# Patient Record
Sex: FEMALE | Race: AMERICAN INDIAN OR ALASKA NATIVE
[De-identification: names, ages, dates, MRNs, and addresses within clinical notes are randomized per-mention and may not be internally consistent; named-entity substitution may affect disease eponyms.]

---

## 2019-03-21 ENCOUNTER — HOSPITAL ENCOUNTER (INPATIENT)
Dept: HOSPITAL 5 - ED | Age: 54
LOS: 2 days | Discharge: HOME | DRG: 304 | End: 2019-03-23
Attending: INTERNAL MEDICINE | Admitting: INTERNAL MEDICINE
Payer: SELF-PAY

## 2019-03-21 DIAGNOSIS — I42.0: ICD-10-CM

## 2019-03-21 DIAGNOSIS — I16.0: Primary | ICD-10-CM

## 2019-03-21 DIAGNOSIS — E87.0: ICD-10-CM

## 2019-03-21 DIAGNOSIS — I11.0: ICD-10-CM

## 2019-03-21 DIAGNOSIS — Z91.19: ICD-10-CM

## 2019-03-21 DIAGNOSIS — I50.43: ICD-10-CM

## 2019-03-21 DIAGNOSIS — Z98.51: ICD-10-CM

## 2019-03-21 DIAGNOSIS — Z82.49: ICD-10-CM

## 2019-03-21 DIAGNOSIS — E87.6: ICD-10-CM

## 2019-03-21 LAB
BASOPHILS # (AUTO): 0 K/MM3 (ref 0–0.1)
BASOPHILS NFR BLD AUTO: 0.5 % (ref 0–1.8)
BUN SERPL-MCNC: 21 MG/DL (ref 7–17)
BUN/CREAT SERPL: 19 %
CALCIUM SERPL-MCNC: 8.5 MG/DL (ref 8.4–10.2)
EOSINOPHIL # BLD AUTO: 0 K/MM3 (ref 0–0.4)
EOSINOPHIL NFR BLD AUTO: 0.4 % (ref 0–4.3)
HCT VFR BLD CALC: 32.6 % (ref 30.3–42.9)
HEMOLYSIS INDEX: 2
HGB BLD-MCNC: 10.3 GM/DL (ref 10.1–14.3)
LYMPHOCYTES # BLD AUTO: 1.2 K/MM3 (ref 1.2–5.4)
LYMPHOCYTES NFR BLD AUTO: 15.3 % (ref 13.4–35)
MCHC RBC AUTO-ENTMCNC: 32 % (ref 30–34)
MCV RBC AUTO: 90 FL (ref 79–97)
MONOCYTES # (AUTO): 0.6 K/MM3 (ref 0–0.8)
MONOCYTES % (AUTO): 7.9 % (ref 0–7.3)
PLATELET # BLD: 380 K/MM3 (ref 140–440)
RBC # BLD AUTO: 3.62 M/MM3 (ref 3.65–5.03)

## 2019-03-21 PROCEDURE — A9502 TC99M TETROFOSMIN: HCPCS

## 2019-03-21 PROCEDURE — 84484 ASSAY OF TROPONIN QUANT: CPT

## 2019-03-21 PROCEDURE — 93005 ELECTROCARDIOGRAM TRACING: CPT

## 2019-03-21 PROCEDURE — 94760 N-INVAS EAR/PLS OXIMETRY 1: CPT

## 2019-03-21 PROCEDURE — 82550 ASSAY OF CK (CPK): CPT

## 2019-03-21 PROCEDURE — 85025 COMPLETE CBC W/AUTO DIFF WBC: CPT

## 2019-03-21 PROCEDURE — 36415 COLL VENOUS BLD VENIPUNCTURE: CPT

## 2019-03-21 PROCEDURE — 71046 X-RAY EXAM CHEST 2 VIEWS: CPT

## 2019-03-21 PROCEDURE — 93010 ELECTROCARDIOGRAM REPORT: CPT

## 2019-03-21 PROCEDURE — 93017 CV STRESS TEST TRACING ONLY: CPT

## 2019-03-21 PROCEDURE — 78452 HT MUSCLE IMAGE SPECT MULT: CPT

## 2019-03-21 PROCEDURE — 80048 BASIC METABOLIC PNL TOTAL CA: CPT

## 2019-03-21 PROCEDURE — 71275 CT ANGIOGRAPHY CHEST: CPT

## 2019-03-21 PROCEDURE — 96374 THER/PROPH/DIAG INJ IV PUSH: CPT

## 2019-03-21 PROCEDURE — 82553 CREATINE MB FRACTION: CPT

## 2019-03-21 PROCEDURE — 93306 TTE W/DOPPLER COMPLETE: CPT

## 2019-03-21 PROCEDURE — 83880 ASSAY OF NATRIURETIC PEPTIDE: CPT

## 2019-03-21 NOTE — HISTORY AND PHYSICAL REPORT
History of Present Illness


Date of examination: 03/21/19


History of present illness: 


53-year-old woman who has a history of hypertension, diagnosed in 2016, 

noncompliant with medication since that time course emergency room with 

complaints of shortness of breath 3 days, describing exertion, PND, orthopnea 

and lower extremity edema


Review of systems


Constitutional: no  weight loss, chills, fever


Ears, eyes, nose, mouth and throat: no nasal congestion, no nasal discharge, no 

sinus pressure, no vision change, no red eye.


Neck: No neck pain or rigidity.


Cardiovascular: no palpitations, chest pain


Respiratory: no cough, +shortness of breath


Gastrointestinal: no hematochezia, abdominal pain


Genitourinary : no  frequency , no hematuria


Musculoskeletal: no joint swelling or muscle ache 


Integumentary: no rash, no pruritis


Neurological: no parathesias, no focal weakness


Endocrine: no cold or heat intolerance, no polyuria or polydipsia


Hematologic/Lymphatic: no easy bruising, no easy bleeding, no gland swelling


Allergic/Immunologic: no urticaria, no angioedema.





PAST MEDICAL HISTORY:hypertension





PAST SURGICAL HISTORY: Tubal ligation





SOCIAL HISTORY: Social alcohol,  no drugs, tobacco





FAMILY HISTORY: Hypertension








Medications and Allergies


                                    Allergies











Allergy/AdvReac Type Severity Reaction Status Date / Time


 


No Known Allergies Allergy   Verified 03/21/19 16:35














Exam





- Physical Exam


Narrative exam: 


General Apperance: The patient lying in bed,  breathing comfortable 


HEENT: Normocephalic, atraumatic.  Pupils equally round and reactive to light, 

EOMI, no sclericterus or JVD or thyromegaly or nodule.  , no carotid bruit, 

mucous membranes moist, no exudate or erythema


Heart: S1-S2, regular is rhythm


Lungs: Crackles bilaterally, breathing comfortable


Abdomen: Positive bowel sounds, soft, nontender, nondistended, no organomegaly


Extremities: + edema, no  cyanosis clubbing


Skin: no rash, nodule, warm and dry


Neuro: cranial nerves 2-12 intact, speech is fluent, motor/sensory intact








- Constitutional


Vitals: 


                                        











Temp Pulse Resp BP Pulse Ox


 


 98.3 F   110 H  28 H  140/93   98 


 


 03/21/19 19:48  03/21/19 22:28  03/21/19 22:28  03/21/19 22:28  03/21/19 22:28














Results





- Labs


CBC & Chem 7: 


                                 03/21/19 17:19





                                 03/21/19 17:19


Labs: 


                              Abnormal lab results











  03/21/19 03/21/19 03/21/19 Range/Units





  17:19 17:19 20:33 


 


RBC  3.62 L    (3.65-5.03)  M/mm3


 


RDW  17.4 H    (13.2-15.2)  %


 


Mono % (Auto)  7.9 H    (0.0-7.3)  %


 


Seg Neutrophils %  75.9 H    (40.0-70.0)  %


 


Potassium   3.4 L   (3.6-5.0)  mmol/L


 


BUN   21 H   (7-17)  mg/dL


 


Glucose   106 H   ()  mg/dL


 


NT-Pro-B Natriuret Pep    12375 H  (0-900)  pg/mL














- Imaging and Cardiology


EKG: image reviewed


Chest x-ray: report reviewed


CT scan - chest: report reviewed





Assessment and Plan


Assessment


New-onset CHF versus hypertensive urgency


Hypertensive urgency: Malignant


Plan


Admit to medicine


Diurese with IV Lasix


sTART Beta blocker, ACE inhibitor, aspirin, check cardiac enzymes, echo


Consult cardiology, DVT prophylaxis


Mitre I's and O's, daily weights

## 2019-03-21 NOTE — XRAY REPORT
PROCEDURE: XR CHEST ROUTINE 2V 

 

TECHNIQUE:  2 view chest 

 

HISTORY: Dyspnea 

 

COMPARISONS: 

 

FINDINGS: 

 

Cardiac silhouette is moderately enlarged. There is bilateral pulmonary vascular congestion with inte
rstitial prominence. No pleural fluid collection identified. 

 

IMPRESSION:  

 

Mild to moderate cardiomegaly. Findings suggestive of CHF. 

 

This document is electronically signed by Bk Conteh MD., March 21 2019 06:12:25 PM ET

## 2019-03-21 NOTE — CAT SCAN REPORT
PROCEDURE: CT angiogram chest with contrast. 

 

TECHNIQUE:  Computerized tomographic angiography of the chest was performed after the IV injection of
 iodinated nonionic contrast including image processing.  The image data was postprocessed using 2-di
mensional multiplanar reformatted (MPR) and 3-dimensional (MIP and/or volume rendered) techniques. Au
tomated exposure control, adjustment of mA and/or kV according to patient size, or iterative reconstr
uction dose optimization techniques were utilized.  

 

CT DOSE LENGTH PRODUCT:  707.85  mGycm 

 

HISTORY: Shortness of breath, dyspnea on exertion, rule out pulmonary embolism. 

 

COMPARISONS:  None. 

 

FINDINGS: 

 

The trachea and central bronchi appear normal. There is mild subsegmental atelectasis in the dependen
t portion of the right lower lobe and in the anterior portion of the left lower lobe. The lungs are o
therwise clear. There is a moderate sized right pleural effusion and a small left pleural effusion. T
he thoracic aorta has a normal caliber without evidence of dissection. The pulmonary arteries enhance
 normally. There is no evidence of pulmonary embolism. There are a few small nonspecific mediastinal 
lymph nodes. The heart size is mildly enlarged. The thoracic skeleton appears intact. 

 

IMPRESSION:  Moderate sized right pleural effusion and small left pleural effusion. No evidence of pu
lmonary embolism. 

 

This document is electronically signed by Richard Piedra MD., March 21 2019 08:53:38 PM ET

## 2019-03-21 NOTE — EMERGENCY DEPARTMENT REPORT
ED Shortness of Breath HPI





- General


Chief Complaint: Dyspnea/Respdistress


Stated Complaint: RICHI


Time Seen by Provider: 03/21/19 19:10


Source: patient


Mode of arrival: Ambulatory


Limitations: No Limitations





- History of Present Illness


Initial Comments: 





Patient is a 53-year-old female that since emergency room with complaints of 

dyspnea on exertion and shortness of breath.  Patient states her symptoms 

started yesterday.  Patient states her symptoms are worsening.  Patient states 3

days ago she took a long car ride of 7+ hours.  The patient states her symptoms 

are worse with exertion and movement and better with rest.  Patient denies chest

pain.  Patient denies fever and chills.  Patient denies headache and dizziness.


MD Complaint: shortness of breath


-: Sudden


Severity: severe


Improves With: rest


Worsens With: exertion


Treatments Prior to Arrival: none





- Related Data


Home Oxygen Therapy: No


                                    Allergies











Allergy/AdvReac Type Severity Reaction Status Date / Time


 


No Known Allergies Allergy   Verified 03/21/19 16:35














ED Review of Systems


ROS: 


Stated complaint: RICHI


Other details as noted in HPI





Constitutional: denies: chills, fever


Eyes: denies: eye pain, eye discharge, vision change


ENT: denies: ear pain, throat pain


Respiratory: shortness of breath, SOB with exertion, SOB at rest.  denies: 

cough, wheezing


Cardiovascular: denies: chest pain, palpitations


Endocrine: no symptoms reported


Gastrointestinal: denies: abdominal pain, nausea, diarrhea


Genitourinary: denies: urgency, dysuria, discharge


Musculoskeletal: denies: back pain, joint swelling, arthralgia


Skin: denies: rash, lesions


Neurological: denies: headache, weakness, paresthesias


Psychiatric: denies: anxiety, depression


Hematological/Lymphatic: denies: easy bleeding, easy bruising





ED Past Medical Hx





- Past Medical History


Previous Medical History?: Yes


Hx Hypertension: Yes





- Surgical History


Past Surgical History?: No





- Family History


Family history: no significant





- Social History


Smoking Status: Never Smoker


Substance Use Type: Alcohol





ED Physical Exam





- General


Limitations: No Limitations


General appearance: alert, in no apparent distress





- Head


Head exam: Present: atraumatic, normocephalic





- Eye


Eye exam: Present: normal appearance





- ENT


ENT exam: Present: mucous membranes moist





- Neck


Neck exam: Present: normal inspection





- Respiratory


Respiratory exam: Present: normal lung sounds bilaterally.  Absent: respiratory 

distress





- Cardiovascular


Cardiovascular Exam: Present: regular rate, normal rhythm.  Absent: systolic 

murmur, diastolic murmur, rubs, gallop





- GI/Abdominal


GI/Abdominal exam: Present: soft, normal bowel sounds





- Extremities Exam


Extremities exam: Present: normal inspection





- Back Exam


Back exam: Present: normal inspection





- Neurological Exam


Neurological exam: Present: alert, oriented X3





- Psychiatric


Psychiatric exam: Present: normal affect, normal mood





- Skin


Skin exam: Present: warm, dry, intact, normal color.  Absent: rash





ED Course


                                   Vital Signs











  03/21/19 03/21/19 03/21/19





  17:10 19:48 19:49


 


Temperature 97.8 F 98.3 F 


 


Pulse Rate 115 H 110 H 


 


Respiratory 22 28 H 28 H





Rate   


 


Blood Pressure 165/115  


 


Blood Pressure  162/93 





[Left]   


 


O2 Sat by Pulse 100 97 97





Oximetry   














  03/21/19 03/21/19 03/21/19





  20:03 20:16 20:58


 


Temperature   


 


Pulse Rate 110 H 107 H 109 H


 


Respiratory  28 H 30 H





Rate   


 


Blood Pressure   


 


Blood Pressure  155/107 160/99





[Left]   


 


O2 Sat by Pulse  93 98





Oximetry   














- Reevaluation(s)


Reevaluation #1: 





03/21/19 21:17


Discussed all results with patient.  Patient agrees with plan of care and 

admission.  Patient was admitted to the hospitalist service.





- Consultations


Consultation #1: 


Hospitalist consulted for admission.  Hospitalist to admit patient.  Hospitalist

 to assume care patient.


03/21/19 21:17








ED Medical Decision Making





- Lab Data


Result diagrams: 


                                 03/21/19 17:19





                                 03/21/19 17:19





- EKG Data


-: EKG Interpreted by Me


EKG shows normal: sinus rhythm, axis, intervals, QRS complexes, ST-T waves


Rate: tachycardia





- EKG Data


Interpretation: LVH





- Radiology Data


Radiology results: report reviewed, image reviewed


interpreted by me: 





Bilateral effusions noted on chest x-ray.  And cardiomegaly





PROCEDURE: XR CHEST ROUTINE 2V 





TECHNIQUE: 2 view chest 





HISTORY: Dyspnea 





COMPARISONS: 





FINDINGS: 





Cardiac silhouette is moderately enlarged. There is bilateral pulmonary vascular

 congestion with 


interstitial prominence. No pleural fluid collection identified. 





IMPRESSION: 





Mild to moderate cardiomegaly. Findings suggestive of CHF. 




















PROCEDURE: CT angiogram chest with contrast. 





TECHNIQUE: Computerized tomographic angiography of the chest was performed after

 the IV injection 


of iodinated nonionic contrast including image processing. The image data was 

postprocessed using 


2-dimensional multiplanar reformatted (MPR) and 3-dimensional (MIP and/or volume

 rendered) 


techniques. Automated exposure control, adjustment of mA and/or kV according to 

patient size, or 


iterative reconstruction dose optimization techniques were utilized. 





CT DOSE LENGTH PRODUCT: 707.85 mGycm 





HISTORY: Shortness of breath, dyspnea on exertion, rule out pulmonary embolism. 





COMPARISONS: None. 





FINDINGS: 





The trachea and central bronchi appear normal. There is mild subsegmental 

atelectasis in the 


dependent portion of the right lower lobe and in the anterior portion of the 

left lower lobe. The 


lungs are otherwise clear. There is a moderate sized right pleural effusion and 

a small left pleural


effusion. The thoracic aorta has a normal caliber without evidence of 

dissection. The pulmonary 


arteries enhance normally. There is no evidence of pulmonary embolism. There are

 a few small 


nonspecific mediastinal lymph nodes. The heart size is mildly enlarged. The 

thoracic skeleton 


appears intact. 





IMPRESSION: Moderate sized right pleural effusion and small left pleural 

effusion. No evidence of 


pulmonary embolism. 








- Medical Decision Making


Patient is a 53-year-old female that presents emergency room with complaints of 

shortness of breath and Exertion.  Patient Found to Have Hypoxia.  Patient 

Placed on Oxygen.  Patient Also Found to Have CHF Changes.  EKG Positive for 

Sinus Tach and Possible LVH.  Patient's Chest X-Ray Shows Bilateral Effusions.  

CT A Done to Rule Out PE.  Patient Found to Have a Moderate-Sized Right Pleural 

Effusion and a Small Left Pleural Effusion.  Patient will be treated with Lasix 

for her CHF.  Patient's new onset CHF.  Patient's BNP elevated.








- Differential Diagnosis


chf. espinoza. sob. pe. htn


Critical Care Time: Yes


Critical care attestation.: 


If time is entered above; I have spent that time in minutes in the direct care 

of this critically ill patient, excluding procedure time.





Critical Care Time: 





35 minutes





ED Disposition


Clinical Impression: 


 SOB (shortness of breath), ESPINOZA (dyspnea on exertion), New onset of congestive 

heart failure, Hypoxia, Pleural effusion





CHF (congestive heart failure)


Qualifiers:


 Heart failure type: unspecified Heart failure chronicity: acute Qualified Code(

s): I50.9 - Heart failure, unspecified





Disposition: DC-09 OP ADMIT IP TO THIS HOSP


Is pt being admited?: Yes


Does the pt Need Aspirin: No


Condition: Critical


Referrals: 


[facility], [place] [Primary Care Provider] - 3-5 Days


Time of Disposition: 21:16

## 2019-03-21 NOTE — EMERGENCY DEPARTMENT REPORT
Blank Doc





- Documentation


Documentation: 





This is a 53-year-old female that presents with shortness of breathe.  Denies 

any chest pain.  Denies any other complaints or symptoms.





This initial assessment/diagnostic orders/clinical plan/treatment(s) is/are 

subject to change based on patient's health status, clinical progression and re-

assessment by fellow clinical providers in the ED.  Further treatment and workup

at subsequent clinical providers discretion.  Patient/guardians urged not to 

elope from the ED as their condition may be serious if not clinically assessed 

and managed.  Initial orders include:


1- Patient sent to [facility] for further evaluation and treatment


2- labs


3- EKG


4- CXR

## 2019-03-22 LAB
BASOPHILS # (AUTO): 0 K/MM3 (ref 0–0.1)
BASOPHILS NFR BLD AUTO: 0.4 % (ref 0–1.8)
BUN SERPL-MCNC: 18 MG/DL (ref 7–17)
BUN/CREAT SERPL: 16 %
CALCIUM SERPL-MCNC: 8.2 MG/DL (ref 8.4–10.2)
EOSINOPHIL # BLD AUTO: 0.1 K/MM3 (ref 0–0.4)
EOSINOPHIL NFR BLD AUTO: 1 % (ref 0–4.3)
HCT VFR BLD CALC: 30.1 % (ref 30.3–42.9)
HEMOLYSIS INDEX: 9
HGB BLD-MCNC: 9.5 GM/DL (ref 10.1–14.3)
LYMPHOCYTES # BLD AUTO: 1.3 K/MM3 (ref 1.2–5.4)
LYMPHOCYTES NFR BLD AUTO: 15.9 % (ref 13.4–35)
MCHC RBC AUTO-ENTMCNC: 32 % (ref 30–34)
MCV RBC AUTO: 90 FL (ref 79–97)
MONOCYTES # (AUTO): 0.7 K/MM3 (ref 0–0.8)
MONOCYTES % (AUTO): 9.2 % (ref 0–7.3)
PLATELET # BLD: 327 K/MM3 (ref 140–440)
RBC # BLD AUTO: 3.36 M/MM3 (ref 3.65–5.03)

## 2019-03-22 RX ADMIN — Medication SCH ML: at 22:24

## 2019-03-22 RX ADMIN — CARVEDILOL SCH MG: 3.12 TABLET, FILM COATED ORAL at 10:50

## 2019-03-22 RX ADMIN — CARVEDILOL SCH MG: 3.12 TABLET, FILM COATED ORAL at 22:24

## 2019-03-22 RX ADMIN — ENOXAPARIN SODIUM SCH MG: 100 INJECTION SUBCUTANEOUS at 10:50

## 2019-03-22 RX ADMIN — ASPIRIN SCH MG: 81 TABLET, CHEWABLE ORAL at 10:49

## 2019-03-22 RX ADMIN — Medication SCH ML: at 10:50

## 2019-03-22 RX ADMIN — LISINOPRIL SCH MG: 5 TABLET ORAL at 10:49

## 2019-03-22 NOTE — PROGRESS NOTE
Assessment and Plan





New-onset CHF, systolic HF with EF ~15%


Hypertensive urgency: Malignant


hypernatremia


hypokalemia





Plan


monitor at medicine


cont Diuresis with IV Lasix, Beta blocker, ACE inhibitor, aspirin, statin


Consulted cardiology, DVT prophylaxis


Mitre I's and O's, daily weights


Plan for stress test tomorrow


replete electrolytes











Subjective


Date of service: 03/22/19


Interval history: 





Patient seen and examined


Denies any chest pain, breathing much better


Plan for stress test tomorrow











Objective





- Exam


Narrative Exam: 





General Apperance: The patient lying in bed,  breathing comfortable 


HEENT: Normocephalic, atraumatic.  Pupils equally round and reactive to light, 

EOMI, no sclericterus or JVD or thyromegaly or nodule.  , no carotid bruit, 

mucous membranes moist, no exudate or erythema


Heart: S1-S2, regular is rhythm


Lungs: + Crackles bilaterally, breathing comfortable


Abdomen: Positive bowel sounds, soft, nontender, nondistended, no organomegaly


Extremities: trace edema, no  cyanosis clubbing


Skin: no rash, nodule, warm and dry


Neuro: cranial nerves 2-12 intact, speech is fluent, motor/sensory intact











- Constitutional


Vitals: 


                               Vital Signs - 12hr











  03/22/19 03/22/19 03/22/19





  09:24 10:00 10:49


 


Temperature 98.4 F  


 


Pulse Rate 101 H  101 H


 


Respiratory 18  





Rate   


 


Blood Pressure 144/96  


 


O2 Sat by Pulse 100 100 





Oximetry   














  03/22/19





  16:47


 


Temperature 


 


Pulse Rate 99 H


 


Respiratory 





Rate 


 


Blood Pressure 137/81


 


O2 Sat by Pulse 100





Oximetry 














- Labs


CBC & Chem 7: 


                                 03/22/19 04:55





                                 03/23/19 04:33


Labs: 


                              Abnormal lab results











  03/21/19 03/21/19 03/21/19 Range/Units





  17:19 17:19 20:33 


 


RBC  3.62 L    (3.65-5.03)  M/mm3


 


Hgb     (10.1-14.3)  gm/dl


 


Hct     (30.3-42.9)  %


 


RDW  17.4 H    (13.2-15.2)  %


 


Mono % (Auto)  7.9 H    (0.0-7.3)  %


 


Seg Neutrophils %  75.9 H    (40.0-70.0)  %


 


Sodium     (137-145)  mmol/L


 


Potassium   3.4 L   (3.6-5.0)  mmol/L


 


Chloride     ()  mmol/L


 


BUN   21 H   (7-17)  mg/dL


 


Glucose   106 H   ()  mg/dL


 


Calcium     (8.4-10.2)  mg/dL


 


NT-Pro-B Natriuret Pep    74228 H  (0-900)  pg/mL














  03/22/19 03/22/19 Range/Units





  04:55 04:55 


 


RBC  3.36 L   (3.65-5.03)  M/mm3


 


Hgb  9.5 L   (10.1-14.3)  gm/dl


 


Hct  30.1 L   (30.3-42.9)  %


 


RDW  17.3 H   (13.2-15.2)  %


 


Mono % (Auto)  9.2 H   (0.0-7.3)  %


 


Seg Neutrophils %  73.5 H   (40.0-70.0)  %


 


Sodium   149 H  (137-145)  mmol/L


 


Potassium   3.3 L  (3.6-5.0)  mmol/L


 


Chloride   109.2 H  ()  mmol/L


 


BUN   18 H  (7-17)  mg/dL


 


Glucose   103 H  ()  mg/dL


 


Calcium   8.2 L  (8.4-10.2)  mg/dL


 


NT-Pro-B Natriuret Pep    (0-900)  pg/mL

## 2019-03-22 NOTE — CONSULTATION
History of Present Illness


Consult date: 03/22/19


Consult reason: congestive heart failure


History of present illness: 





Patient is a 53 year old woman who has no prior medical history and does not 

take any medications. She presented with shortness of breath that started 

approximately 5 days ago. She denies chest pain, palpitations and dizziness. 

There was no syncope. Patient reports mild edema but this has since resolved 

since initial treatment in the emergency department. She has an elevated BNP and

her chest x-ray shows cardiomegaly with interstitial edema. Chest CT scan 

negative for pulmonary embolism. A cardiac consultation was requested for CHF 

evaluation.





Past History


Past Medical History: No medical history





Medications and Allergies


                                    Allergies











Allergy/AdvReac Type Severity Reaction Status Date / Time


 


No Known Allergies Allergy   Verified 03/21/19 16:35











Active Meds: 


Active Medications





Acetaminophen (Tylenol)  650 mg PO Q4H PRN


   PRN Reason: Pain MILD(1-3)/Fever >100.5/HA


Aspirin (Baby Aspirin)  81 mg PO QDAY [facility]


   Last Admin: 03/22/19 10:49 Dose:  81 mg


   Documented by: 


Carvedilol (Coreg)  3.125 mg PO BID [facility]


   Last Admin: 03/22/19 10:50 Dose:  3.125 mg


   Documented by: 


Enoxaparin Sodium (Lovenox)  40 mg SUB-Q QDAY [facility]


   Last Admin: 03/22/19 10:50 Dose:  40 mg


   Documented by: 


Furosemide (Lasix)  40 mg IV BID [facility]


   Last Admin: 03/22/19 10:50 Dose:  40 mg


   Documented by: 


Lisinopril (Zestril)  2.5 mg PO QDAY [facility]


   Last Admin: 03/22/19 10:49 Dose:  2.5 mg


   Documented by: 


Ondansetron HCl (Zofran)  4 mg IV Q8H PRN


   PRN Reason: Nausea And Vomiting


Sodium Chloride (Sodium Chloride Flush Syringe 10 Ml)  10 ml IV BID [facility]


   Last Admin: 03/22/19 10:50 Dose:  10 ml


   Documented by: 


Sodium Chloride (Sodium Chloride Flush Syringe 10 Ml)  10 ml IV PRN PRN


   PRN Reason: LINE FLUSH











Physical Examination


                                   Vital Signs











Temp Pulse Resp BP Pulse Ox


 


 97.8 F   115 H  22   165/115   100 


 


 03/21/19 17:10  03/21/19 17:10  03/21/19 17:10  03/21/19 17:10  03/21/19 17:10











General appearance: no acute distress


HEENT: Positive: PERRL


Neck: Positive: trachea midline


Cardiac: Positive: Reg Rate and Rhythm


Lungs: Positive: Decreased Breath Sounds


Neuro: Positive: Grossly Intact


Extremities: Absent: edema





Results





                                 03/22/19 04:55





                                 03/22/19 04:55


                                 Cardiac Enzymes











  03/21/19 03/22/19 Range/Units





  22:57 04:55 


 


CK-MB (CK-2)  2.2  2.0  (0.0-4.0)  ng/mL








                                       CBC











  03/21/19 03/22/19 Range/Units





  17:19 04:55 


 


WBC  7.9  7.8  (4.5-11.0)  K/mm3


 


RBC  3.62 L  3.36 L  (3.65-5.03)  M/mm3


 


Hgb  10.3  9.5 L  (10.1-14.3)  gm/dl


 


Hct  32.6  30.1 L  (30.3-42.9)  %


 


Plt Count  380  327  (140-440)  K/mm3


 


Lymph #  1.2  1.3  (1.2-5.4)  K/mm3


 


Mono #  0.6  0.7  (0.0-0.8)  K/mm3


 


Eos #  0.0  0.1  (0.0-0.4)  K/mm3


 


Baso #  0.0  0.0  (0.0-0.1)  K/mm3








                          Comprehensive Metabolic Panel











  03/21/19 03/22/19 Range/Units





  17:19 04:55 


 


Sodium  144  149 H  (137-145)  mmol/L


 


Potassium  3.4 L  3.3 L  (3.6-5.0)  mmol/L


 


Chloride  106.0  109.2 H  ()  mmol/L


 


Carbon Dioxide  23  24  (22-30)  mmol/L


 


BUN  21 H  18 H  (7-17)  mg/dL


 


Creatinine  1.1  1.1  (0.7-1.2)  mg/dL


 


Glucose  106 H  103 H  ()  mg/dL


 


Calcium  8.5  8.2 L  (8.4-10.2)  mg/dL














Assessment and Plan





- Patient Problems


(1) CHF (congestive heart failure)


Current Visit: Yes   Status: Acute   


Qualifiers: 


   Qualified Code(s): I50.9 - Heart failure, unspecified   


Plan to address problem: 





Continue current medical therapy including IV diuretics.





We will obtain an echocardiogram for LVEF assessment.

## 2019-03-23 VITALS — SYSTOLIC BLOOD PRESSURE: 132 MMHG | DIASTOLIC BLOOD PRESSURE: 85 MMHG

## 2019-03-23 LAB
BUN SERPL-MCNC: 18 MG/DL (ref 7–17)
BUN/CREAT SERPL: 15 %
CALCIUM SERPL-MCNC: 8.6 MG/DL (ref 8.4–10.2)
HEMOLYSIS INDEX: 3

## 2019-03-23 RX ADMIN — Medication SCH ML: at 11:45

## 2019-03-23 RX ADMIN — LISINOPRIL SCH MG: 5 TABLET ORAL at 11:42

## 2019-03-23 RX ADMIN — ENOXAPARIN SODIUM SCH MG: 100 INJECTION SUBCUTANEOUS at 11:44

## 2019-03-23 RX ADMIN — CARVEDILOL SCH MG: 3.12 TABLET, FILM COATED ORAL at 11:42

## 2019-03-23 RX ADMIN — ASPIRIN SCH MG: 81 TABLET, CHEWABLE ORAL at 11:44

## 2019-03-23 NOTE — DISCHARGE SUMMARY
Providers





- Providers


Date of Admission: 


03/21/19 22:38





Date of discharge: 03/23/19


Attending physician: 


BHASKAR SANCHEZ





                                        





03/21/19 22:38


Consult to Physician [CONS] Routine 


   Comment: 


   Consulting Provider: OZZY VAZQUEZ


   Physician Instructions: 


   Reason For Exam: ?chf











Primary care physician: 


PRIMARY CARE MD








Hospitalization


Condition: Critical


Pertinent studies: 





Lexiscan MPI shows a dilated nonischemic cardiomyopathy with left ventricular 

ejection fraction 23%


Echocardiogram shows dilated cardiomyopathy with left ventricular ejection fr

action of 15-20%.








Hospital course: 


Patient is a 53 year old woman who has no prior medical history and does not 

take any medications. She presented with shortness of breath that started 

approximately 5 days ago.  She has an elevated BNP and her chest x-ray shows 

cardiomegaly with interstitial edema. Chest CT scan negative for pulmonary 

embolism. She was admitted for further evaluation and management. Placed on 

Diuresis with IV Lasix, alos started on Beta blocker, ACE inhibitor, aspirin, 

statin


Consulted cardiology, Monitored I's and O's, daily weights, repleted elec

trolytes. Lexiscan MPI shows a dilated nonischemic cardiomyopathy with left 

ventricular ejection fraction 23%, Echocardiogram shows dilated cardiomyopathy 

with left ventricular ejection fraction of 15-20%. Cardiology recommended 

medical Mx and outpt followup. She was then discharged home with outpt followup.

 





Discharge diagnosis:


New-onset CHF, systolic and diastolic HF with EF ~15%


Hypertensive urgency: Malignant, resolved


Non ischemic cardiomyopathy


hypernatremia, stable


hypokalemia, repleted








Disposition: DC-01 TO HOME OR SELFCARE


Time spent for discharge: 34 minutes





Core Measure Documentation





- Palliative Care


Palliative Care/ Comfort Measures: Not Applicable





- Core Measures


Any of the following diagnoses?: heart failure





- Heart Failure Discharge Requirements


ACE/ARB for LVSD if EF <40%: Yes


Beta blocker at discharge: Yes





Exam





- Physical Exam


Narrative exam: 





General Apperance: The patient lying in bed,  breathing comfortable 


HEENT: Normocephalic, atraumatic.  Pupils equally round and reactive to light, 

EOMI, no sclericterus or JVD or thyromegaly or nodule.  , no carotid bruit, 

mucous membranes moist, no exudate or erythema


Heart: S1-S2, regular is rhythm


Lungs: no Crackles bilaterally, breathing comfortable


Abdomen: Positive bowel sounds, soft, nontender, nondistended, no organomegaly


Extremities:no edema, no  cyanosis clubbing


Skin: no rash, nodule, warm and dry


Neuro: cranial nerves 2-12 intact, speech is fluent, motor/sensory intact











- Constitutional


Vitals: 


                                        











Temp Pulse Resp BP Pulse Ox


 


 98.4 F   100 H  18   132/85   100 


 


 03/23/19 04:00  03/23/19 13:24  03/23/19 04:00  03/23/19 13:24  03/23/19 13:24














Plan


Activity: advance as tolerated


Weight Bearing Status: Non-Weight Bearing


Diet: low fat, low salt


Special Instructions: restrict fluid intake to (1.2L daily), record daily 

weights, record daily BP diary


[facility]: 


[facility]Referring[facility] - 3-5 Days


PAVAN BAÑUELOS MD [Staff Physician] - 7 Days


Prescriptions: 


AtorvaSTATin [Lipitor] 40 mg PO QHS #30 tab


Aspirin [Aspirin BABY CHEW TAB] 81 mg PO QDAY #30 tab.chew


Carvedilol [Coreg] 3.125 mg PO BID #60 tablet


Potassium Chloride [K-Dur] 10 meq PO QDAY #30 tablet


Furosemide [Lasix TAB] 40 mg PO QDAY #30 tablet


Lisinopril [Zestril TAB] 2.5 mg PO QDAY #30 tablet

## 2019-03-23 NOTE — PROGRESS NOTE
Assessment and Plan





1.  Acute: Combined systolic and diastolic heart failure


2.  Dilated nonischemic cardiomyopathy.





Lexiscan MPI shows a dilated nonischemic cardiomyopathy with left ventricular 

ejection fraction 23%


Echocardiogram shows dilated cardiomyopathy with left ventricular ejection 

fraction of 15-20%.





Plan.


Continue present medication with plans of early discharge follow-up in the 

office.





Subjective


Date of service: 03/23/19


Interval history: 





No cardiac symptoms





Objective


                                   Vital Signs











  Temp Pulse Resp BP BP Pulse Ox


 


 03/23/19 04:00  98.4 F  97 H  18   139/97  98


 


 03/22/19 23:55  98.1 F  101 H  18  137/92   100


 


 03/22/19 19:54    20   


 


 03/22/19 19:49   99 H    


 


 03/22/19 19:41  98.4 F  98 H  18  131/79   100


 


 03/22/19 16:47   99 H   137/81   100


 


 03/22/19 10:49   101 H    














- Physical Examination


General: Appears Well


HEENT: Positive: PERRL


Neck: Positive: trachea midline.  Negative: JVD/HJR


Cardiac: Positive: Regular Rate, S3, PMI, Dilated, Laterally Displaced


Lungs: Positive: clear to auscultation.  Negative: No Wheeze, Rales, Rhonchi


Neuro: Positive: Grossly Intact


Extremities: Absent: edema





- Labs and Meds


                          Comprehensive Metabolic Panel











  03/23/19 Range/Units





  04:33 


 


Sodium  149 H  (137-145)  mmol/L


 


Potassium  3.6  (3.6-5.0)  mmol/L


 


Chloride  110.2 H  ()  mmol/L


 


Carbon Dioxide  25  (22-30)  mmol/L


 


BUN  18 H  (7-17)  mg/dL


 


Creatinine  1.2  (0.7-1.2)  mg/dL


 


Glucose  117 H  ()  mg/dL


 


Calcium  8.6  (8.4-10.2)  mg/dL














- Imaging and Cardiology


EKG: image reviewed

## 2019-03-26 NOTE — TREADMILL REPORT
THALLIUM STRESS TEST



LEFT VENTRICLE:  Left ventricle is moderately to severely dilated.  There is a

small to moderate sized, fixed basal anterior defect of moderate intensity.  No

reversible defects identified.  Gated analysis demonstrates severe left

ventricular systolic dysfunction with ejection fraction of 23%.



CONCLUSION:  Evidence of a severe dilated cardiomyopathy with severe left

ventricular systolic dysfunction, ejection fraction 23%.  Small to moderate

sized basal anterior defect appears consistent with breast attenuation artifact.

 There is no reversible ischemia demonstrated.  Clinical correlation is

recommended.





DD: 03/25/2019 12:22

DT: 03/26/2019 00:34

JOB# [id number]  [id number]

CA/NTS